# Patient Record
Sex: MALE | Race: WHITE | URBAN - METROPOLITAN AREA
[De-identification: names, ages, dates, MRNs, and addresses within clinical notes are randomized per-mention and may not be internally consistent; named-entity substitution may affect disease eponyms.]

---
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
## 2025-01-03 ENCOUNTER — OFFICE VISIT (OUTPATIENT)
Dept: FAMILY MEDICINE CLINIC | Age: 20
End: 2025-01-03
Payer: COMMERCIAL

## 2025-01-03 VITALS
TEMPERATURE: 99 F | WEIGHT: 151.6 LBS | HEART RATE: 68 BPM | DIASTOLIC BLOOD PRESSURE: 62 MMHG | SYSTOLIC BLOOD PRESSURE: 118 MMHG | OXYGEN SATURATION: 99 % | BODY MASS INDEX: 23.39 KG/M2

## 2025-01-03 DIAGNOSIS — Z84.0 FAMILY HISTORY OF LUPUS ERYTHEMATOSUS: ICD-10-CM

## 2025-01-03 DIAGNOSIS — R07.9 CHEST PAIN, UNSPECIFIED TYPE: ICD-10-CM

## 2025-01-03 DIAGNOSIS — R30.0 DYSURIA: Primary | ICD-10-CM

## 2025-01-03 LAB
APPEARANCE FLUID: CLEAR
BILIRUBIN, POC: NORMAL
BLOOD URINE, POC: NORMAL
CLARITY, POC: CLEAR
COLOR, POC: YELLOW
GLUCOSE URINE, POC: NORMAL MG/DL
KETONES, POC: NORMAL MG/DL
LEUKOCYTE EST, POC: NORMAL
NITRITE, POC: NORMAL
PH, POC: 5.5
PROTEIN, POC: NORMAL MG/DL
SPECIFIC GRAVITY, POC: 1.02
UROBILINOGEN, POC: 0.2 MG/DL

## 2025-01-03 PROCEDURE — 99214 OFFICE O/P EST MOD 30 MIN: CPT | Performed by: FAMILY MEDICINE

## 2025-01-03 PROCEDURE — 81002 URINALYSIS NONAUTO W/O SCOPE: CPT | Performed by: FAMILY MEDICINE

## 2025-01-03 NOTE — PROGRESS NOTES
Here with concerns for UTI.      Symptoms started 2-3 weeks ago.  Cloudy urine that makes hard to see bottom of toilet. Drinks lots of fluids.   Frequency: No  Urgency: Yes  Burning: No but sometimes \"uncomfortable\" but doesn't hurt   Blood in urine: No  Abd pain: Yes  Back pain: Yes  Nausea: No  Vomiting: No  Bowel changes :No  Fever: No  Sometimes feels like \"white \" stuff in urine  Not currently sexually active.  Has tried nothing for symptoms.          Still having chest discomfort, said it started 6-8 months ago, but has been getting worse the past couple of weeks. Feels like pains are sharper. States mostly in left lower chest but sometimes in right upper chest area. Sx are daily throughout day but sometimes worse than normal. When gets really bad take naproxyn and helps some. No cough, no SOB.       Body mass index is 23.39 kg/m².    Vitals:    01/03/25 1459   BP: 118/62   Site: Left Upper Arm   Position: Sitting   Cuff Size: Medium Adult   Pulse: 68   Temp: 99 °F (37.2 °C)   TempSrc: Infrared   SpO2: 99%   Weight: 68.8 kg (151 lb 9.6 oz)     Wt Readings from Last 3 Encounters:   01/03/25 68.8 kg (151 lb 9.6 oz) (44%, Z= -0.15)*   09/17/24 62.7 kg (138 lb 3.2 oz) (23%, Z= -0.73)*   02/02/24 68.9 kg (152 lb) (50%, Z= 0.00)*     * Growth percentiles are based on CDC (Boys, 2-20 Years) data.         9/17/2024    10:35 AM 2/2/2024    11:15 AM 8/29/2023    10:45 AM 3/20/2023    11:17 AM 8/4/2022     3:04 PM 2/5/2021     4:06 PM   PHQ Scores   PHQ2 Score 0 1 0 2 0 0   PHQ9 Score 0 1 0 2 0 3       Interpretation of Total Score Depression Severity: 1-4 = Minimal depression, 5-9 = Mild depression, 10-14 = Moderate depression, 15-19 = Moderately severe depression, 20-27 = Severe depression       GENERAL:Alert and oriented x 4 NAD, affect appropriate and normal appearing weight, well hydrated, well developed.  LUNG:clear to auscultation bilaterally with normal respiratory effort  CV: Normal heart sounds, regular rate

## 2025-01-05 LAB — BACTERIA UR CULT: NORMAL

## 2025-01-07 LAB
C TRACH DNA UR QL NAA+PROBE: NEGATIVE
N GONORRHOEA DNA UR QL NAA+PROBE: NEGATIVE

## 2025-02-03 LAB — NUCLEAR AB, ACNC, PT, SERUM: NEGATIVE
